# Patient Record
Sex: MALE | Race: BLACK OR AFRICAN AMERICAN | URBAN - METROPOLITAN AREA
[De-identification: names, ages, dates, MRNs, and addresses within clinical notes are randomized per-mention and may not be internally consistent; named-entity substitution may affect disease eponyms.]

---
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
## 2018-03-19 ENCOUNTER — HISTORICAL (OUTPATIENT)
Dept: ADMINISTRATIVE | Facility: HOSPITAL | Age: 18
End: 2018-03-19

## 2018-08-20 ENCOUNTER — HISTORICAL (OUTPATIENT)
Dept: ADMINISTRATIVE | Facility: HOSPITAL | Age: 18
End: 2018-08-20

## 2020-11-05 ENCOUNTER — HISTORICAL (OUTPATIENT)
Dept: ADMINISTRATIVE | Facility: HOSPITAL | Age: 20
End: 2020-11-05

## 2021-08-11 ENCOUNTER — HISTORICAL (OUTPATIENT)
Dept: ADMINISTRATIVE | Facility: HOSPITAL | Age: 21
End: 2021-08-11

## 2021-08-11 LAB — SARS-COV-2 AG RESP QL IA.RAPID: POSITIVE

## 2022-04-07 ENCOUNTER — HISTORICAL (OUTPATIENT)
Dept: ADMINISTRATIVE | Facility: HOSPITAL | Age: 22
End: 2022-04-07

## 2022-04-23 VITALS
SYSTOLIC BLOOD PRESSURE: 134 MMHG | DIASTOLIC BLOOD PRESSURE: 72 MMHG | BODY MASS INDEX: 25.5 KG/M2 | WEIGHT: 178.13 LBS | HEIGHT: 70 IN

## 2022-04-30 NOTE — ED PROVIDER NOTES
"   Patient:   Pepe Dave             MRN: [id number]            FIN: [id number]               Age:   21 years     Sex:  Male     :  2000   Associated Diagnoses:   Acute COVID-19   Author:   Abhi Williamson PA-C      Basic Information   Time seen: Date & time 2021 20:10:00.   History source: Patient.   Arrival mode: Private vehicle, walking.   History limitation: None.   Additional information: Chief Complaint from Nursing Triage Note : Chief Complaint   2021 20:04 CDT      Chief Complaint           recent covid exposure, requesting covid test. states "little cough." denies any other complaints  .      History of Present Illness   The patient presents with 22 y/o M presents to the ED requesting COVID test. Notes mild cough for the last 2 days. Denies congestion, cp, sob, fever, chills. Wife COVID+. 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  The onset was 2  days ago.  The course/duration of symptoms is constant.  The degree at onset was minimal.  The degree at present is minimal.  Risk factors consist of none.  Therapy today: none.  Associated symptoms: none.  Additional history: none.        Review of Systems   Constitutional symptoms:  No fever,    Respiratory symptoms:  Cough.      Health Status   Allergies: No known allergies.   Medications:  (Selected)   Prescriptions  Prescribed  Bentyl 10 mg oral capsule: 10 mg = 1 cap(s), Oral, TID, # 21 cap(s), 0 Refill(s), Pharmacy: [facility]/pharmacy #[id number]  Depakote 250 mg oral delayed release tablet: 250 mg = 1 tab(s), Oral, BID, F31.9, # 60 tab(s), 5 Refill(s), Pharmacy: [facility]/pharmacy #[id number]  Pepcid 20 mg oral tablet: 20 mg = 1 tab(s), Oral, BID, # 30 tab(s), 0 Refill(s), Pharmacy: [facility]/pharmacy #[id number]  Vyvanse 60 mg oral capsule: 60 mg = 1 cap(s), Oral, qAM, ADHD F90.9  Fill in March, # 30 cap(s), 0 Refill(s)  Zofran 4 mg oral tablet: 4 mg = 1 tab(s), Oral, q8hr, PRN PRN as needed for nausea/vomiting, # 15 tab(s), 0 Refill(s), Pharmacy: [facility]/pharmacy #[id number]  albuterol CFC free 90 " mcg/inh inhalation aerosol with adapter: 2 puff(s), INH, QID, # 34 gm, 0 Refill(s), Pharmacy: [facility]/pharmacy #[id number]  .   Immunizations: Per nurse's notes.      Past Medical/ Family/ Social History   Medical history:    Resolved  Neck sprain ([id number]):  Resolved on 9/23/2016 at 16 years.  Sports physical ([id number]):  Resolved on 9/23/2016 at 16 years.  Immunization due ([id number]):  Resolved on 12/21/2016 at 16 years.  Tinea cruris ([id number]):  Resolved on 12/21/2016 at 16 years..   Surgical history: Negative.   Family history:    Hypertension.  Father  Thyroid disease.  Mother  .   Social history:    Social & Psychosocial Habits    Alcohol  03/03/2015 Risk Assessment: Denies Alcohol Use    04/28/2016  Concerns about alcohol use in household: No    Employment/School  05/17/2016  Status: Student    Comment: 11th grade - 04/28/2016 10:16 - [employer]ad[employer] Moe LPN; Progressed to 12th grade - 05/17/2016 07:54 - Marixa Hidalgo LPN    06/28/2017  Status: Student    Comment: [employer] ( 2yrs college) - 06/28/2017 11:07 - Ariadna Moe LPN    Exercise  05/17/2016  Times per week: Daily    Home/Environment  05/17/2016  Lives with: Mother, Siblings    Alcohol abuse in household: No    Substance abuse in household: No    Smoker in household: No    Injuries/Abuse/Neglect in household: No    Feels unsafe at home: No    Safe place to go: Yes    Agency(s)/Others notified: No    Family/Friends available to help: Yes    Concern for family members at home: No    Major illness in household: Yes    Financial concerns: Yes    Concerns over TV/Computer/Game use: No    Nutrition/Health  05/17/2016  Type of diet: Regular    Comment: very good - 04/28/2016 10:16 Ariadna Euceda LPN; Eating well - 05/17/2016 07:55 Marixa Pagan LPN    Substance Use  03/03/2015 Risk Assessment: Denies Substance Abuse    04/28/2016  Concerns about substance abuse in household: No    Tobacco  03/03/2015 Risk  Assessment: Denies Tobacco Use    04/28/2016  Concerns about tobacco use in household: No    01/30/2019  Use: 10 or more cigarettes (1/    Patient Wants Consult For Cessation Counseling Yes    09/26/2019  Use: 10 or more cigarettes (1/    Patient Wants Consult For Cessation Counseling No    11/05/2020  Use: 4 or less cigarettes(less    Patient Wants Consult For Cessation Counseling No    Abuse/Neglect  09/26/2019  SHX Any signs of abuse or neglect No    11/05/2020  SHX Any signs of abuse or neglect No  .   Problem list:    Active Problems (8)  ADHD (attention deficit hyperactivity disorder), combined type   Asthma   Benign and innocent cardiac murmurs   Bipolar disorder   History of drug use   Mood swings   Persistent disorder of initiating or maintaining sleep   Tobacco user   .      Physical Examination               Vital Signs   Vital Signs   8/11/2021 20:04 CDT      Temperature Oral          37 DegC                             Temperature Oral (calculated)             98.60 DegF                             Peripheral Pulse Rate     77 bpm                             Respiratory Rate          18 br/min                             SpO2                      99 %                             Oxygen Therapy            Room air                             Systolic Blood Pressure   115 mmHg                             Diastolic Blood Pressure  63 mmHg  .   Measurements   8/11/2021 20:04 CDT      Weight Dosing             81.5 kg                             Weight Measured and Calculated in Lbs     179.67 lb                             Weight Estimated          81.5 kg  .   Basic Oxygen Information   8/11/2021 20:04 CDT      SpO2                      99 %                             Oxygen Therapy            Room air  .   General:  Alert, no acute distress.    Skin:  Warm, dry.    Head:  Normocephalic, atraumatic.    Neck:  Trachea midline.   Eye:  Normal conjunctiva.   Respiratory:  Respirations are non-labored.    Neurological:  Alert and oriented to person, place, time, and situation.   Psychiatric:  Cooperative.      Medical Decision Making   Differential Diagnosis:  Viral syndrome.   Documents reviewed:  Emergency department nurses' notes.   Results review:  Lab results : Lab View   8/11/2021 20:07 CDT      COVID-19 Rapid            POSITIVE    .      Reexamination/ Reevaluation   Re-examination/Re-evaluation:   Impression and Plan   Diagnosis   Acute COVID-19 ([id number] U07.1)   Plan   Condition: Stable.    Disposition: Eloped, Patient was only seen and examined during the sort triage process.  The patient eloped from the emergency department before being placed in a room.  The patient at the time of examination was stable and in no acute distress..